# Patient Record
Sex: MALE | Race: OTHER | HISPANIC OR LATINO | URBAN - METROPOLITAN AREA
[De-identification: names, ages, dates, MRNs, and addresses within clinical notes are randomized per-mention and may not be internally consistent; named-entity substitution may affect disease eponyms.]

---

## 2024-09-02 ENCOUNTER — HOSPITAL ENCOUNTER (EMERGENCY)
Facility: MEDICAL CENTER | Age: 44
End: 2024-09-02
Attending: EMERGENCY MEDICINE

## 2024-09-02 VITALS
HEIGHT: 64 IN | WEIGHT: 223.99 LBS | SYSTOLIC BLOOD PRESSURE: 140 MMHG | BODY MASS INDEX: 38.24 KG/M2 | TEMPERATURE: 97.7 F | HEART RATE: 78 BPM | DIASTOLIC BLOOD PRESSURE: 78 MMHG | OXYGEN SATURATION: 94 % | RESPIRATION RATE: 18 BRPM

## 2024-09-02 DIAGNOSIS — K43.9 VENTRAL HERNIA WITHOUT OBSTRUCTION OR GANGRENE: ICD-10-CM

## 2024-09-02 DIAGNOSIS — R10.13 EPIGASTRIC PAIN: ICD-10-CM

## 2024-09-02 LAB
ALBUMIN SERPL BCP-MCNC: 4.3 G/DL (ref 3.2–4.9)
ALBUMIN/GLOB SERPL: 1.4 G/DL
ALP SERPL-CCNC: 72 U/L (ref 30–99)
ALT SERPL-CCNC: 48 U/L (ref 2–50)
ANION GAP SERPL CALC-SCNC: 12 MMOL/L (ref 7–16)
AST SERPL-CCNC: 29 U/L (ref 12–45)
BASOPHILS # BLD AUTO: 0.9 % (ref 0–1.8)
BASOPHILS # BLD: 0.06 K/UL (ref 0–0.12)
BILIRUB SERPL-MCNC: 0.4 MG/DL (ref 0.1–1.5)
BUN SERPL-MCNC: 15 MG/DL (ref 8–22)
CALCIUM ALBUM COR SERPL-MCNC: 8.9 MG/DL (ref 8.5–10.5)
CALCIUM SERPL-MCNC: 9.1 MG/DL (ref 8.5–10.5)
CHLORIDE SERPL-SCNC: 105 MMOL/L (ref 96–112)
CO2 SERPL-SCNC: 23 MMOL/L (ref 20–33)
CREAT SERPL-MCNC: 0.68 MG/DL (ref 0.5–1.4)
EKG IMPRESSION: NORMAL
EOSINOPHIL # BLD AUTO: 0.08 K/UL (ref 0–0.51)
EOSINOPHIL NFR BLD: 1.2 % (ref 0–6.9)
ERYTHROCYTE [DISTWIDTH] IN BLOOD BY AUTOMATED COUNT: 43.3 FL (ref 35.9–50)
GFR SERPLBLD CREATININE-BSD FMLA CKD-EPI: 119 ML/MIN/1.73 M 2
GLOBULIN SER CALC-MCNC: 3 G/DL (ref 1.9–3.5)
GLUCOSE SERPL-MCNC: 122 MG/DL (ref 65–99)
HCT VFR BLD AUTO: 50.6 % (ref 42–52)
HGB BLD-MCNC: 16.7 G/DL (ref 14–18)
IMM GRANULOCYTES # BLD AUTO: 0.1 K/UL (ref 0–0.11)
IMM GRANULOCYTES NFR BLD AUTO: 1.5 % (ref 0–0.9)
LIPASE SERPL-CCNC: 25 U/L (ref 11–82)
LYMPHOCYTES # BLD AUTO: 2.5 K/UL (ref 1–4.8)
LYMPHOCYTES NFR BLD: 36.7 % (ref 22–41)
MCH RBC QN AUTO: 27.9 PG (ref 27–33)
MCHC RBC AUTO-ENTMCNC: 33 G/DL (ref 32.3–36.5)
MCV RBC AUTO: 84.6 FL (ref 81.4–97.8)
MONOCYTES # BLD AUTO: 0.7 K/UL (ref 0–0.85)
MONOCYTES NFR BLD AUTO: 10.3 % (ref 0–13.4)
NEUTROPHILS # BLD AUTO: 3.38 K/UL (ref 1.82–7.42)
NEUTROPHILS NFR BLD: 49.4 % (ref 44–72)
NRBC # BLD AUTO: 0 K/UL
NRBC BLD-RTO: 0 /100 WBC (ref 0–0.2)
PLATELET # BLD AUTO: 242 K/UL (ref 164–446)
PMV BLD AUTO: 10.2 FL (ref 9–12.9)
POTASSIUM SERPL-SCNC: 4.1 MMOL/L (ref 3.6–5.5)
PROT SERPL-MCNC: 7.3 G/DL (ref 6–8.2)
RBC # BLD AUTO: 5.98 M/UL (ref 4.7–6.1)
SODIUM SERPL-SCNC: 140 MMOL/L (ref 135–145)
WBC # BLD AUTO: 6.8 K/UL (ref 4.8–10.8)

## 2024-09-02 PROCEDURE — 83690 ASSAY OF LIPASE: CPT

## 2024-09-02 PROCEDURE — 36415 COLL VENOUS BLD VENIPUNCTURE: CPT

## 2024-09-02 PROCEDURE — 99284 EMERGENCY DEPT VISIT MOD MDM: CPT

## 2024-09-02 PROCEDURE — 93005 ELECTROCARDIOGRAM TRACING: CPT

## 2024-09-02 PROCEDURE — 85025 COMPLETE CBC W/AUTO DIFF WBC: CPT

## 2024-09-02 PROCEDURE — 80053 COMPREHEN METABOLIC PANEL: CPT

## 2024-09-02 PROCEDURE — 93005 ELECTROCARDIOGRAM TRACING: CPT | Performed by: EMERGENCY MEDICINE

## 2024-09-02 RX ORDER — DOCUSATE SODIUM 100 MG/1
100 CAPSULE, LIQUID FILLED ORAL DAILY
Qty: 60 CAPSULE | Refills: 0 | Status: SHIPPED | OUTPATIENT
Start: 2024-09-02 | End: 2024-11-01

## 2024-09-02 NOTE — ED NOTES
Pt ambulated to the room with steady gait and without assistance. Changed into a gown and placed on Cardiac, SpO2 and BP monitors. Call light within reach. I-pad  left at bedside. No further complaints at this time. Chart up for ERP.

## 2024-09-02 NOTE — ED NOTES
Conversation with patient using language line . Agree with triage note. Charted for ERP. Call light within reach. Family at bedside.     MD at bedside.

## 2024-09-02 NOTE — DISCHARGE INSTRUCTIONS
Los análisis de rodolfo fueron normales. No hay signos de infección ni problemas con los órganos internos. El dolor puede provenir de la hernia. Pueden ser incómodas, mary no peligrosas, siempre que se puedan masajear suavemente para que vuelvan a koo lugar. Recomendamos utilizar el ablandador de heces recetado, ya que el estreñimiento y la necesidad de hacer fuerza para evacuar los intestinos pueden hacer que las hernias se agranden. Hemos enviado a un especialista para que le ayude a conseguir kimmy yany con un cirujano general para que considere la posibilidad de reparar la hernia.    (Your blood tests were all normal.  There is no sign of infection or any problems with internal organs.  Your pain may be coming from your hernia.  They can be uncomfortable, but not dangerous, as long as they can be gently massaged back inside.  We recommend using the prescribed stool softener, since constipation and pushing hard to pass bowel movements can make hernias larger.  We placed a referral to help you get an appointment with general surgery to consider repairing the hernia.)

## 2024-09-02 NOTE — ED TRIAGE NOTES
"Chief Complaint   Patient presents with    Abdominal Pain     Pain in the epigastric area that began a week ago with dizziness upon standing. Denies SOB, N/V. Denies PMH       Patient to triage ambulatory with a steady gait, AAOx4, Appropriate precautions in place.     Explained wait time and triage process. Placed back in lobby. Told to notify ED tech or RN of any changes, verbalized understanding.    /78   Pulse 85   Temp 36.5 °C (97.7 °F) (Temporal)   Resp 18   Ht 1.62 m (5' 3.78\")   Wt 102 kg (223 lb 15.8 oz)   SpO2 93%   BMI 38.71 kg/m²     "

## 2024-09-02 NOTE — ED NOTES
Patient provided discharge instructions using language line . Patient verbalized understanding. Patient leaving ER in stable condition. Patient ambulatory with steady gait. Wristband removed.

## 2024-09-02 NOTE — ED PROVIDER NOTES
"ER Provider Note    Scribed for Jonathan Thompson M.d. by Joy Carballo. 9/2/2024  11:58 AM    Primary Care Provider: No primary care provider noted.    CHIEF COMPLAINT  Chief Complaint   Patient presents with    Abdominal Pain     Pain in the epigastric area that began a week ago with dizziness upon standing. Denies SOB, N/V. Denies PMH     EXTERNAL RECORDS REVIEWED  No prior records for review    HPI/ROS  LIMITATION TO HISTORY   Select: : None    OUTSIDE HISTORIAN(S):  None    Chsae Gibbons is a 44 y.o. male who presents to the ED complaining of abdominal pain onset one month ago and worsening one week ago. The patient states his pain is not exacerbated by eating or drinking. He endorses additional midline abdominal swelling which has progressively worsened. He denies fever, chills, or vomiting. The patient does not drink alcohol. He denies any history of pancreatitis or gallstones.    PAST MEDICAL HISTORY  History reviewed. No pertinent past medical history.    SURGICAL HISTORY  History reviewed. No pertinent surgical history.    FAMILY HISTORY  History reviewed. No pertinent family history.    SOCIAL HISTORY   reports that he has quit smoking. His smoking use included cigarettes. He has never used smokeless tobacco. He reports that he does not drink alcohol and does not use drugs.    CURRENT MEDICATIONS  Previous Medications    No medications noted     ALLERGIES  None noted.    PHYSICAL EXAM  VITAL SIGNS: /78   Pulse 85   Temp 36.5 °C (97.7 °F) (Temporal)   Resp 18   Ht 1.62 m (5' 3.78\")   Wt 102 kg (223 lb 15.8 oz)   SpO2 93%   BMI 38.71 kg/m²   Pulse ox interpretation: I interpret this pulse ox as normal.  Constitutional: Alert in no apparent distress.  HENT: No signs of trauma, Bilateral external ears normal, Nose normal.   Eyes: Conjunctiva normal, Non-icteric.   Neck: Normal range of motion, Supple, No stridor.   Lymphatic: No lymphadenopathy noted.   Cardiovascular: Regular rate " and rhythm, no murmurs.   Thorax & Lungs: Normal breath sounds, No respiratory distress, No wheezing  Abdomen: Bowel sounds normal, Soft. Valsalva reproduces a vertical midline hernia whch spontaneously reduces with relaxation, no sign of strangulation or incarceration.  Skin: Warm, Dry, No erythema, No rash.   Back: No midline bony tenderness.   Extremities: Intact distal pulses, No edema, No cyanosis.  Musculoskeletal: Good range of motion in all major joints. No or major deformities noted.   Neurologic: Alert , Normal motor function, Normal sensory function, No focal deficits noted.   Psychiatric: Affect normal, Judgment normal, Mood normal.     DIAGNOSTIC STUDIES    Labs:   Labs Reviewed   CBC WITH DIFFERENTIAL - Abnormal; Notable for the following components:       Result Value    Immature Granulocytes 1.50 (*)     All other components within normal limits   COMP METABOLIC PANEL - Abnormal; Notable for the following components:    Glucose 122 (*)     All other components within normal limits   LIPASE   ESTIMATED GFR     All labs reviewed by me.    EKG:   I have independently interpreted this EKG  Results for orders placed or performed during the hospital encounter of 24   EKG   Result Value Ref Range    Report       [facility]    Test Date:  2024  Pt Name:    OLVIN HARKINS     Department: ER  MRN:        [id number]                      Room:  Gender:     Male                         Technician: [id number]  :        1980                   Requested By:ER TRIAGE PROTOCOL  Order #:    [id number]                    Reading MD:    Measurements  Intervals                                Axis  Rate:       77                           P:          48  MI:         143                          QRS:        85  QRSD:       98                           T:          61  QT:         376  QTc:        426    Interpretive Statements  Sinus rhythm  Borderline ST elevation,  anterior leads  No previous ECG available for comparison         COURSE & MEDICAL DECISION MAKING     INITIAL ASSESSMENT, COURSE AND PLAN  Care Narrative:     ED OBS: No; Patient does not meet criteria for ED Observation.     11:58 AM - Patient presents to the ED with abdominal pain. Per triage protocol, EKG, Lipase, CMP, and CBC w/ diff were ordered. Patient evaluated at bedside. Differential diagnoses include but not limited to: Abdominal wall pain from hernia versus intraabdominal infection, gastritis, or pancreatitis.    12:33 PM - Labs are reassuring, no evidence of a dangerous or infectious cause of the patient's symptoms, which do seem likely to be related to his hernia, though it is easily reducible with relaxation, not requiring manual reduction, no evidence of incarceration or strangulation.  He will be discharged with Colace, to soften stool, prevent straining, and will be referred to general surgery to consider repair.  The patient is advised to return for further evaluation should the patient's symptoms worsen. The patient understands and agrees to discharge home.       DISPOSITION AND DISCUSSIONS  I have discussed management of the patient with the following physicians and MONROE's:  None    Discussion of management with other QHP or appropriate source(s): None     Escalation of care considered, and ultimately not performed: diagnostic imaging.  Considered, but no bedside suggestion of obstruction.    Barriers to care at this time, including but not limited to: Patient does not have established PCP and Patient does not have insurance.     Decision tools and prescription drugs considered including, but not limited to: Medication modification was performed, to provide stool softeners to reduce straining, minimize discomfort, reduce abdominal wall pressure .    The patient will return for new or worsening symptoms and is stable at the time of discharge.    The patient is referred to a primary physician for  blood pressure management, diabetic screening, and for all other preventative health concerns.    DISPOSITION:  Patient will be discharged home in stable condition.    FOLLOW UP:  Magdalena Jason M.D.  [address] [place] [id number] [phone]    Schedule an appointment as soon as possible for a visit         OUTPATIENT MEDICATIONS:  There are no discharge medications for this patient.       FINAL DIAGNOSIS  1. Epigastric pain    2. Ventral hernia without obstruction or gangrene        Joy GOETZ (Bonnie), am scribing for, and in the presence of, Jonathan Thompson M.D..    Electronically signed by: Joy Carballo (Bonnie), 9/2/2024    Jonathan GOETZ M.D. personally performed the services described in this documentation, as scribed by Joy Carballo in my presence, and it is both accurate and complete.

## 2024-11-08 ENCOUNTER — APPOINTMENT (OUTPATIENT)
Dept: RADIOLOGY | Facility: MEDICAL CENTER | Age: 44
End: 2024-11-08
Attending: STUDENT IN AN ORGANIZED HEALTH CARE EDUCATION/TRAINING PROGRAM
Payer: OTHER MISCELLANEOUS

## 2024-11-08 ENCOUNTER — HOSPITAL ENCOUNTER (EMERGENCY)
Facility: MEDICAL CENTER | Age: 44
End: 2024-11-09
Attending: STUDENT IN AN ORGANIZED HEALTH CARE EDUCATION/TRAINING PROGRAM
Payer: OTHER MISCELLANEOUS

## 2024-11-08 DIAGNOSIS — S61.213A LACERATION OF LEFT MIDDLE FINGER WITHOUT FOREIGN BODY WITHOUT DAMAGE TO NAIL, INITIAL ENCOUNTER: ICD-10-CM

## 2024-11-08 DIAGNOSIS — S68.111A AMPUTATION OF LEFT INDEX FINGER: ICD-10-CM

## 2024-11-08 DIAGNOSIS — S68.113A AMPUTATION OF LEFT MIDDLE FINGER: ICD-10-CM

## 2024-11-08 LAB — HOLDING TUBE BB 8507: NORMAL

## 2024-11-08 PROCEDURE — 304999 HCHG REPAIR-SIMPLE/INTERMED LEVEL 1

## 2024-11-08 PROCEDURE — A9270 NON-COVERED ITEM OR SERVICE: HCPCS | Performed by: STUDENT IN AN ORGANIZED HEALTH CARE EDUCATION/TRAINING PROGRAM

## 2024-11-08 PROCEDURE — 99284 EMERGENCY DEPT VISIT MOD MDM: CPT

## 2024-11-08 PROCEDURE — 303747 HCHG EXTRA SUTURE

## 2024-11-08 PROCEDURE — 306637 HCHG MISC ORTHO ITEM RC 0274

## 2024-11-08 PROCEDURE — 26989 UNLISTED PX HANDS/FINGERS: CPT

## 2024-11-08 PROCEDURE — 26951 AMPUTATION OF FINGER/THUMB: CPT

## 2024-11-08 PROCEDURE — 700111 HCHG RX REV CODE 636 W/ 250 OVERRIDE (IP): Mod: JZ | Performed by: STUDENT IN AN ORGANIZED HEALTH CARE EDUCATION/TRAINING PROGRAM

## 2024-11-08 PROCEDURE — 700102 HCHG RX REV CODE 250 W/ 637 OVERRIDE(OP): Performed by: STUDENT IN AN ORGANIZED HEALTH CARE EDUCATION/TRAINING PROGRAM

## 2024-11-08 PROCEDURE — 73130 X-RAY EXAM OF HAND: CPT | Mod: LT

## 2024-11-08 PROCEDURE — 304217 HCHG IRRIGATION SYSTEM

## 2024-11-08 PROCEDURE — 96374 THER/PROPH/DIAG INJ IV PUSH: CPT

## 2024-11-08 PROCEDURE — 97597 DBRDMT OPN WND 1ST 20 CM/<: CPT

## 2024-11-08 RX ORDER — BUPIVACAINE HYDROCHLORIDE 5 MG/ML
10 INJECTION, SOLUTION EPIDURAL; INTRACAUDAL ONCE
Status: COMPLETED | OUTPATIENT
Start: 2024-11-08 | End: 2024-11-09

## 2024-11-08 RX ORDER — CEPHALEXIN 500 MG/1
500 CAPSULE ORAL ONCE
Status: COMPLETED | OUTPATIENT
Start: 2024-11-08 | End: 2024-11-08

## 2024-11-08 RX ADMIN — CEPHALEXIN 500 MG: 500 CAPSULE ORAL at 23:59

## 2024-11-08 RX ADMIN — MORPHINE SULFATE 6 MG: 10 INJECTION INTRAVENOUS at 21:27

## 2024-11-08 ASSESSMENT — FIBROSIS 4 INDEX: FIB4 SCORE: 0.73

## 2024-11-08 NOTE — LETTER
"    EMPLOYEE’S CLAIM FOR COMPENSATION/ REPORT OF INITIAL TREATMENT  FORM C-4  PLEASE TYPE OR PRINT    EMPLOYEE’S CLAIM - PROVIDE ALL INFORMATION REQUESTED   First Name                    GRISEL Stone                  Last Name  Nahid Gibbons Birthdate                    1980                Sex  Male Claim Number (Insurer’s Use Only)     Home Address  [address]  Age  44 y.o. Height  1.63 m (5' 4.17\") Weight  90.7 kg (200 lb) Social Security Number     [facility] [place]  [address] Telephone  [phone]   Mailing Address  [address] [facility] Zip  [address] Primary Language Spoken  Telugu    INSURER   [employer]PART[employer]       Employee's Occupation (Job Title) When Injury or Occupational Disease Occurred  [employer]   Employer's Name/Company Name    [employer] Telephone   [phone]   Office Mail Address (Number and Street)    [address] [place] [address] [address], [place] [address]   Date of Injury (if applicable) 11/8/2024               Hours Injury (if applicable)  7:25 PM Date Employer Notified  11/8/2024 Last Day of Work after Injury or Occupational Disease  11/8/2024 Supervisor to Whom Injury Reported  José Miguel Neumann   Address or Location of Accident (if applicable)  [address] [place] [address]   What were you doing at the time of accident? (if applicable)  Limpiando la [employer]    How did this injury or occupational disease occur? (Be specific and answer in detail. Use additional sheet if necessary)  Estaba Limpiando la maquina y tenia dorina sensor y se prendio   If you believe that you have an occupational disease, when did you first have knowledge of the disability and its relationship to your employment?   Witnesses to the Accident (if applicable)  todos Mayur Reynoso      Nature of Injury or Occupational Disease  Laceration  Part(s) of Body " Injured or Affected  Thumb (L)      I CERTIFY THAT THE ABOVE IS TRUE AND CORRECT TO T HE BEST OF MY KNOWLEDGE AND THAT I HAVE PROVIDED THIS INFORMATION IN ORDER TO OBTAIN THE BENEFITS OF [place]CC[place], OR [place]  I HEREBY AUTHORIZE ANY PHYSICIAN, CHIROPRACTOR, SURGEON, PRACTITIONER OR ANY OTHER PERSON, ANY HOSPITAL, INCLUDING [facility] OR [facility], ANY  MEDICAL SERVICE ORGANIZATION, ANY INSURANCE COMPANY, OR OTHER INSTITUTION OR ORGANIZATION TO RELEASE TO EACH OTHER, ANY MEDICAL OR OTHER INFORMATION, INCLUDING BENEFITS PAID OR PAYABLE, PERTINENT TO THIS INJURY OR DISEASE, EXCEPT INFORMATION RELATIVE TO DIAGNOSIS, TREATMENT AND/OR COUNSELING FOR AIDS, PSYCHOLOGICAL CONDITIONS, ALCOHOL OR CONTROLLED SUBSTANCES, FOR WHICH I MUST GIVE SPECIFIC AUTHORIZATION.  A PHOTOSTAT OF THIS AUTHORIZATION SHALL BE VALID AS THE ORIGINAL.     Date 11/08/2024   Place  [facility] Employee’s Original or  *Electronic Signature   THIS REPORT MUST BE COMPLETED AND MAILED WITHIN 3 WORKING DAYS OF TREATMENT   Place  [facility], EMERGENCY DEPT    Name of Facility      Date 11/8/2024 Diagnosis and Description of Injury or Occupational Disease  (S61.213A) Laceration of left middle finger without foreign body without damage to nail, initial encounter  (S68.111A) Amputation of left index finger  (S68.113A) Amputation of left middle finger  Diagnoses of Laceration of left middle finger without foreign body without damage to nail, initial encounter, Amputation of left index finger, and Amputation of left middle finger were pertinent to this visit. Is there evidence that the injured employee was under the influence of alcohol and/or another controlled substance at the time of accident?  []No  [] Yes (if yes, please explain)   Hour   No   Treatment: Antibiotic prophylaxis, excisional debridement, amputation revision,  laceration repair    Have you advised the patient to remain off work five days or more?   [] Yes Indicate dates: From   To    [] No      If no, is the injured employee capable of: [] full duty [] modified duty                     If modified duty, specify any limitations / restrictions:  Unable to use left hand until cleared by orthopedic surgeon                                                                                                                                                                                                                                                                                                                                                                                                               X-Ray Findings: Positive    From information given by the employee, together with medical evidence, can you directly connect this injury or occupational disease as job incurred?  []Yes   [] No Yes    Is additional medical care by a physician indicated? []Yes [] No  Yes  Comments:Will require orthopedic follow-up and clearance    Do you know of any previous injury or disease contributing to this condition or occupational disease? []Yes [] No (Explain if yes)                          No   Date  11/8/2024 Print Health Care Provider’s Name   I certify that the employer’s copy of  this form was delivered to the employer on:   Address  [address] [place] [address]    INSURER'S USE ONLY                       [facility]   [address] Provider’s Tax ID Number  [id number]   Telephone  Dept: [phone]    Health Care Provider’s Original or Electronic Signature  e-SHAHRZAD Robbins M.D. Degree (MD,DO, DC,PA-C,APRN)  MD  Choose (if applicable)      ORIGINAL - TREATING HEALTHCARE PROVIDER PAGE 2 - INSURER/TPA PAGE 3 - EMPLOYER PAGE 4 - EMPLOYEE             Form C-4 (rev.08/23)

## 2024-11-09 ENCOUNTER — PHARMACY VISIT (OUTPATIENT)
Dept: PHARMACY | Facility: MEDICAL CENTER | Age: 44
End: 2024-11-09
Payer: COMMERCIAL

## 2024-11-09 VITALS
TEMPERATURE: 98 F | BODY MASS INDEX: 34.15 KG/M2 | WEIGHT: 200 LBS | SYSTOLIC BLOOD PRESSURE: 118 MMHG | HEART RATE: 72 BPM | RESPIRATION RATE: 18 BRPM | OXYGEN SATURATION: 95 % | DIASTOLIC BLOOD PRESSURE: 66 MMHG | HEIGHT: 64 IN

## 2024-11-09 PROCEDURE — RXOTC WILLOW AMBULATORY OTC CHARGE: Performed by: PHARMACIST

## 2024-11-09 PROCEDURE — 700111 HCHG RX REV CODE 636 W/ 250 OVERRIDE (IP): Performed by: STUDENT IN AN ORGANIZED HEALTH CARE EDUCATION/TRAINING PROGRAM

## 2024-11-09 PROCEDURE — RXMED WILLOW AMBULATORY MEDICATION CHARGE: Performed by: STUDENT IN AN ORGANIZED HEALTH CARE EDUCATION/TRAINING PROGRAM

## 2024-11-09 RX ORDER — BUPIVACAINE HYDROCHLORIDE 5 MG/ML
10 INJECTION, SOLUTION EPIDURAL; INTRACAUDAL ONCE
Status: COMPLETED | OUTPATIENT
Start: 2024-11-09 | End: 2024-11-09

## 2024-11-09 RX ORDER — HYDROCODONE BITARTRATE AND ACETAMINOPHEN 5; 325 MG/1; MG/1
1 TABLET ORAL EVERY 4 HOURS PRN
Qty: 15 TABLET | Refills: 0 | Status: SHIPPED | OUTPATIENT
Start: 2024-11-09 | End: 2024-11-12

## 2024-11-09 RX ORDER — CEPHALEXIN 500 MG/1
500 CAPSULE ORAL 2 TIMES DAILY
Qty: 14 CAPSULE | Refills: 0 | Status: ACTIVE | OUTPATIENT
Start: 2024-11-09 | End: 2024-11-12 | Stop reason: SDUPTHER

## 2024-11-09 RX ADMIN — BUPIVACAINE HYDROCHLORIDE 10 ML: 5 INJECTION, SOLUTION EPIDURAL; INTRACAUDAL at 00:03

## 2024-11-09 RX ADMIN — BUPIVACAINE HYDROCHLORIDE 10 ML: 5 INJECTION, SOLUTION EPIDURAL; INTRACAUDAL at 00:02

## 2024-11-09 NOTE — ED NOTES
"Chase Gibbons has been discharged from the Emergency Room.    IV discontinued and gauze bandage placed, pt in possession of belongings.    Discharge instructions, which include signs and symptoms to monitor patient for, as well as detailed information regarding Laceration care provided.  Patient verbalizes understanding of follow up care and medication management. All questions and concerns addressed at this time.     Patient provided with education on when to return to the ER and verbally understands with no concerns. Patient advised on setting up MyChart and information provided about patient survey.  Patient leaves ER in no apparent distress. This RN provided education regarding returning to the ER for any new concerns or changes in patient's condition.      /66   Pulse 72   Temp 36.7 °C (98 °F)   Resp 18   Ht 1.63 m (5' 4.17\")   Wt 90.7 kg (200 lb)   SpO2 95%   BMI 34.15 kg/m²   "

## 2024-11-09 NOTE — ED TRIAGE NOTES
Chase Gibbons  44 y.o. male  Chief Complaint   Patient presents with    Amputation     BIB [facility] from work. Left middle and ring finger amputation from . No active bleeding. No thinners. Received fentanyl 100 mcg. States Last tetanus 5 yrs ago. Amputated middle finger kept on ice.      Pt is GCS 15, speaking in full sentences, follows commands and responds appropriately to questions. Resp are even and unlabored. Patient denies pain.     Vitals:    11/08/24 2036   BP:    Pulse: 84   Resp:    Temp:    SpO2: 95%

## 2024-11-09 NOTE — DISCHARGE INSTRUCTIONS
Ibuprofen 400 mg every 4-6 hours    Tylenol 1000 mg every 4 hours    Do not take more than 4000 mg of Tylenol in a 24-hour period    Take norco if ibuprofen and tylenol does not work

## 2024-11-09 NOTE — ED PROVIDER NOTES
"ER Provider Note    Scribed for Dr. Yossi Madrigal MD. by Irwin Lockwood. 11/8/2024  8:58 PM    Primary Care Provider: No primary care provider noted.    CHIEF COMPLAINT  Chief Complaint   Patient presents with    Amputation     BIB [facility] from work. Left middle and ring finger amputation from . No active bleeding. No thinners. Received fentanyl 100 mcg. States Last tetanus 5 yrs ago. Amputated middle finger kept on ice.        EXTERNAL RECORDS REVIEWED  Patient was last seen here on 9/2/24 for epigastric pain and was subsequently discharged.    HPI/ROS  LIMITATION TO HISTORY   Select: Language Sinhala,  Used     OUTSIDE HISTORIAN(S):  Significant other is present at bedside.    Chase Gibbons is a 44 y.o. right handed male who presents to the ED for evaluation of left middle and ring finger partial amputation from a  at work onset 1 hour ago. Denies blood thinners. Last tetanus was 5 years ago. Denies any medical problems.    PAST MEDICAL HISTORY  No past medical history noted.    SURGICAL HISTORY  No past surgical history noted.    FAMILY HISTORY  No family history noted.    SOCIAL HISTORY   reports that he has quit smoking. His smoking use included cigarettes. He has never used smokeless tobacco. He reports that he does not drink alcohol and does not use drugs.    CURRENT MEDICATIONS  Previous Medications    No medications noted.       ALLERGIES  Patient has no known allergies.    PHYSICAL EXAM  BP (!) 143/86   Pulse 84   Temp 36.7 °C (98 °F) (Temporal)   Resp 18   Ht 1.63 m (5' 4.17\")   Wt 90.7 kg (200 lb)   SpO2 95%   BMI 34.15 kg/m²   Physical Exam  Vitals and nursing note reviewed.   Constitutional:       Appearance: He is well-developed.   HENT:      Head: Normocephalic.   Cardiovascular:      Rate and Rhythm: Normal rate and regular rhythm.      Heart sounds: No murmur heard.  Pulmonary:      Effort: Pulmonary effort is normal.      Breath sounds: Normal " "breath sounds. No wheezing or rales.   Abdominal:      Palpations: Abdomen is soft.      Tenderness: There is no abdominal tenderness.   Musculoskeletal:      Comments: Complete amputation of the second left finger at the level of the midshaft proximal phalanx  Complete amputation of distal phalanx of the third left finger.   Skin:     General: Skin is warm.      Comments: 3 cm laceration to the volar aspect of the left middle finger   Neurological:      General: No focal deficit present.      Mental Status: He is alert and oriented to person, place, and time.         DIAGNOSTIC STUDIES & PROCEDURES    Radiology:   The attending Emergency Physician has independently interpreted the diagnostic imaging associated with this visit and is awaiting the final reading from the radiologist, which will be displayed below.    Preliminary interpretation is a follows: X-ray of left hand with amputation of index finger and distal phalanx of middle finger  Radiologist interpretation:    DX-HAND 3+ LEFT   Final Result         1.  Long finger middle phalanx amputation.   2.  Index finger proximal phalanx amputation.           Orthopedic Nerve Block (Finger)        Authorized by: Yossi Madrigal      Consent: Verbal consent obtained.      Risks and benefits: risks, benefits and alternatives were discussed      Consent given by: patient and/or guardian      Patient understanding: patient/guardian states understanding of the procedure being performed      Patient consent: the patient/guardian's understanding of the procedure matches consent given      Patient identity confirmed: verbally with patient and arm band      Time out: Immediately prior to procedure a \"time out\" was called to verify the correct patient, procedure, equipment, support staff and site/side marked as required.      Location details: left index finger      Injection: using standard, sterile technique 4 cc of bupivacaine without epinepherine injected      Complication: " "Tolerated well without complication.     Orthopedic Nerve Block (Finger)        Authorized by: Yossi Madrigal      Consent: Verbal consent obtained.      Risks and benefits: risks, benefits and alternatives were discussed      Consent given by: patient and/or guardian      Patient understanding: patient/guardian states understanding of the procedure being performed      Patient consent: the patient/guardian's understanding of the procedure matches consent given      Patient identity confirmed: verbally with patient and arm band      Time out: Immediately prior to procedure a \"time out\" was called to verify the correct patient, procedure, equipment, support staff and site/side marked as required.      Location details: left middle finger      Injection: using standard, sterile technique 4 cc of bupivacaine without epinepherine injected      Complication: Tolerated well without complication.     Amputation revision procedure #1    Verbal consent obtained from patient after explaining risks and benefits.    Patient placed in the appropriate position skin was prepped with chlorhexidine swab and draped in sterile fashion and anesthesia of the left index finger was achieved with a digital block using 0.5% bupivacaine without epinephrine.  The amputated finger was then extensively scrubbed with a surgical scrub pad and irrigated with jet normal saline.  Excisional debridement was performed on the wound with removal of subcutaneous fat, tendon and bone. Approximately 1 mm of bone was removed. Bone cutters and rongeur's were then used to trim down the excess bone.  A 15 blade scalpel was then used to make a V shaped incision on the plantar aspect of the finger and a VY plasty was performed to then cover the bone with overlying flap.  16 total sutures with 5-0 Ethilon were then used to secure the flap over the bone.  Procedure was tolerated well by the patient.      Amputation revision procedure #2  Verbal consent obtained from " patient after explaining risks and benefits.    Patient placed in the appropriate position skin was prepped with chlorhexidine swab and draped in sterile fashion and anesthesia of the left index finger was achieved with a digital block using 0.5% bupivacaine without epinephrine.  The amputated finger was then extensively scrubbed with a surgical scrub pad and irrigated with jet normal saline.  Excisional debridement was performed on the wound with removal of subcutaneous fat, tendon and bone. Approximately 1 mm of bone was removed.  5-0 Ethilon sutures were then used to close the skin edges over the exposed bone portion.  13 total sutures were used.  The patient tolerated the procedure well with no complications    Laceration Repair Procedure    Indication: Laceration    Location/Description: volar aspect of left middle finger    Procedure: The patient was placed in the appropriate position and anesthesia around the laceration was obtained with a full digital block of the left long (middle) finger using 0.5% Bupivacaine without epinephrine. The area was then irrigated with normal saline. The laceration was closed with 5-0 Ethilon using interrupted sutures. There were no additional lacerations requiring repair. The wound area was then dressed with gauze.      Total repaired wound length: 3 cm.     Other Items: Suture count: 3    The patient tolerated the procedure well.    Complications: None        COURSE & MEDICAL DECISION MAKING    INITIAL ASSESSMENT AND PLAN  Care Narrative:   Narcotics Script: In prescribing controlled substances to this patient, I certify that I have obtained and reviewed the medical history of Chase Gibbons. I have also made a good alexandra effort to obtain applicable records from other providers who have treated the patient and records did not demonstrate any increased risk of substance abuse that would prevent me from prescribing controlled substances.     I have conducted a physical  exam and documented it. I have reviewed Mr. Nahid Gibbons’s prescription history as maintained by the [place] Prescription Monitoring Program.     I have assessed the patient’s risk for abuse, dependency, and addiction using the validated Opioid Risk Tool available at https://www.mdcalc.com/tbbjiw-quhf-eazf-ort-narcotic-abuse.     Given the above, I believe the benefits of controlled substance therapy outweigh the risks. The reasons for prescribing controlled substances include non-narcotic, oral analgesic alternatives have been inadequate for pain control. Accordingly, I have discussed the risk and benefits, treatment plan, and alternative therapies with the patient.       8:58 PM - Patient seen and evaluated at bedside.     0921 pm -discussed the case with Ortho on-call Dr. Austin who recommends reaching out to [facility] regarding replant however he believes will likely not meet criteria and they will recommend amputation revision    9:34 PM - I discussed the patient's case and the above findings with [facility] in [place] who has paged the on call hand surgeon.     9:36 PM I discussed the patient's case and the above findings with Dr. Crespo from [facility] who recommends amputation revision. He does not feel replant would be successful or beneficial to the patient.     9:52 PM - Patient was reevaluated at bedside. Discussed the plan of care, including amputation revision. Patient is agreeable to the plan.    12:17 AM - Patient was reevaluated at bedside.    12:24 AM - Patient was reevaluated at bedside. Discussed the plan for discharge including strict return precautions and wound care instructions. Patient and his family are agreeable to the plan.    ADDITIONAL PROBLEM LIST AND DISPOSITION  No past medical history on file.                 DISPOSITION AND DISCUSSIONS  I have discussed management of the patient with the following physicians and MONROE's: Dr. Crespo (hand surgery at [facility]),  Dr Austin ortho    Discussion of management with other [facility] or appropriate source(s): None     Escalation of care considered, and ultimately not performed: .    Barriers to care at this time, including but not limited to: Patient does not have established PCP.     Decision tools and prescription drugs considered including, but not limited to: Antibiotics Keflex and Pain Medications NSAIDs Tylenol Norco .    I reviewed prescription monitoring program for patient's narcotic use before prescribing a scheduled drug.The patient will not drink alcohol nor drive with prescribed medications. The patient will return for new or worsening symptoms and is stable at the time of discharge.    The patient is referred to a primary physician for blood pressure management, diabetic screening, and for all other preventative health concerns.    In prescribing controlled substances to this patient, I certify that I have obtained and reviewed the medical history of Chase Gibbons. I have also made a good alexandra effort to obtain applicable records from other providers who have treated the patient and records did not demonstrate any increased risk of substance abuse that would prevent me from prescribing controlled substances.     I have conducted a physical exam and documented it. I have reviewed Mr. Nahid Gibbons’s prescription history as maintained by the [place] Prescription Monitoring Program.     I have assessed the patient’s risk for abuse, dependency, and addiction using the validated Opioid Risk Tool available at https://www.mdcalc.com/qrriso-voms-ocfe-ort-narcotic-abuse.     Given the above, I believe the benefits of controlled substance therapy outweigh the risks. The reasons for prescribing controlled substances include non-narcotic, oral analgesic alternatives have been inadequate for pain control. Accordingly, I have discussed the risk and benefits, treatment plan, and alternative therapies with the patient.       DISPOSITION:  Patient will be discharged home in stable condition.    FOLLOW UP:  Hira Austin M.D.  [address] [place] [id number] [phone]    Schedule an appointment as soon as possible for a visit       [facility] [place] [facility], Emergency Dept  [address]  [place] [id number] [phone]          OUTPATIENT MEDICATIONS:  New Prescriptions    CEPHALEXIN (KEFLEX) 500 MG CAP    Take 1 Capsule by mouth 2 times a day for 7 days.    HYDROCODONE-ACETAMINOPHEN (NORCO) 5-325 MG TAB PER TABLET    Take 1 Tablet by mouth every four hours as needed (severe pain) for up to 3 days.        FINAL IMPRESSION   1. Laceration of left middle finger without foreign body without damage to nail, initial encounter    2. Amputation of left index finger    3. Amputation of left middle finger         Irwin GOETZ (Bonnie), am scribing for, and in the presence of, Yossi Madrigal M.D..    Electronically signed by: Irwin Valerio), 11/8/2024    IoYssi M.D. personally performed the services described in this documentation, as scribed by Irwin Lockwood in my presence, and it is both accurate and complete.    The note accurately reflects work and decisions made by me.  Yossi Madrigal M.D.  11/9/2024  12:53 AM

## 2024-11-12 ENCOUNTER — HOSPITAL ENCOUNTER (EMERGENCY)
Facility: MEDICAL CENTER | Age: 44
End: 2024-11-12
Attending: EMERGENCY MEDICINE
Payer: OTHER MISCELLANEOUS

## 2024-11-12 VITALS
HEART RATE: 74 BPM | TEMPERATURE: 98.1 F | WEIGHT: 216.71 LBS | OXYGEN SATURATION: 92 % | BODY MASS INDEX: 37 KG/M2 | HEIGHT: 64 IN | DIASTOLIC BLOOD PRESSURE: 79 MMHG | RESPIRATION RATE: 18 BRPM | SYSTOLIC BLOOD PRESSURE: 130 MMHG

## 2024-11-12 DIAGNOSIS — Z51.89 ENCOUNTER FOR WOUND RE-CHECK: ICD-10-CM

## 2024-11-12 DIAGNOSIS — S68.119A TRAUMATIC AMPUTATION OF FINGER, INITIAL ENCOUNTER: ICD-10-CM

## 2024-11-12 DIAGNOSIS — T79.2XXA SEROMA DUE TO TRAUMA (HCC): ICD-10-CM

## 2024-11-12 PROCEDURE — A9270 NON-COVERED ITEM OR SERVICE: HCPCS | Performed by: EMERGENCY MEDICINE

## 2024-11-12 PROCEDURE — 700102 HCHG RX REV CODE 250 W/ 637 OVERRIDE(OP): Performed by: EMERGENCY MEDICINE

## 2024-11-12 PROCEDURE — 99283 EMERGENCY DEPT VISIT LOW MDM: CPT

## 2024-11-12 RX ORDER — CEPHALEXIN 500 MG/1
500 CAPSULE ORAL ONCE
Status: COMPLETED | OUTPATIENT
Start: 2024-11-12 | End: 2024-11-12

## 2024-11-12 RX ORDER — CEPHALEXIN 500 MG/1
500 CAPSULE ORAL 4 TIMES DAILY
Qty: 28 CAPSULE | Refills: 0 | Status: ACTIVE | OUTPATIENT
Start: 2024-11-12 | End: 2024-11-19

## 2024-11-12 RX ADMIN — CEPHALEXIN 500 MG: 500 CAPSULE ORAL at 13:20

## 2024-11-12 ASSESSMENT — FIBROSIS 4 INDEX: FIB4 SCORE: 0.73

## 2024-11-12 ASSESSMENT — PAIN DESCRIPTION - PAIN TYPE: TYPE: ACUTE PAIN

## 2024-11-12 NOTE — LETTER
PHYSICIAN’S AND CHIROPRACTIC PHYSICIAN'S   PROGRESS REPORT   CERTIFICATION OF DISABILITY Claim Number:     Social Security Number: [id number]   Patient’s Name: Chase Gibbons Date of Injury: 11/8/2024   Employer:   Name of MCO (if applicable):      Patient’s Job Description/Occupation: @[id number](CLM,312,1,,,,,2) )@       Previous Injuries/Diseases/Surgeries Contributing to the Condition:         Diagnosis: (Z51.89) Encounter for wound re-check  (T79.2XXA) Seroma due to trauma (HCC)  (S68.119A) Traumatic amputation of finger, initial encounter      Related to the Industrial Injury?       Explain:        Objective Medical Findings:           None - Discharged                         Stable                    Ratable           Generally Improved                         Condition Worsened                  Condition Same  May Have Suffered a Permanent Disability       Treatment Plan:              No Change in Therapy                  PT/OT Prescribed                      Medication May be Used While Working        Case Management                          PT/OT Discontinued    Consultation    Further Diagnostic Studies:    Prescription(s)                 Released to FULL DUTY /No Restrictions on (Date):       Certified TOTALLY TEMPORARILY DISABLED (Indicate Dates) From:   To:      Released to RESTRICTED/Modified Duty on (Date): From:   To:    Restrictions Are:         No Sitting    No Standing    No Pulling Other:         No Bending at Waist     No Stooping     No Lifting        No Carrying     No Walking Lifting Restricted to (lbs.):          No Pushing        No Climbing     No Reaching Above Shoulders       Date of Next Visit:    Date of this Exam: 11/12/2024 Physician/Chiropractic Physician Name: No name on file Physician/Chiropractic Physician Signature:     [place]:  [address] [place] [address], [address] [place], [place] [address] - Telephone [phone] [place]:  [address]  [facility] [facility], [address] [facility]  [place], [place] [address] - Telephone [phone]    [email or web address]  D-39 (Rev. 10/24)

## 2024-11-12 NOTE — ED PROVIDER NOTES
"  ER Provider Note    Scribed for Kristofer Stallworth M.D. by Tai Hednerson. 11/12/2024   12:52 PM    Primary Care Provider: Pcp Pt States None    CHIEF COMPLAINT  Chief Complaint   Patient presents with    Wound Check     On 11/8 pt was seen for partial amputation of 2nd and 3rd digit on left hand from . Pt reports over the last few days there has been increased redness/swelling to the area, denies drainage or fevers.      EXTERNAL RECORDS REVIEWED  Outpatient Notes Patient was seen here four days ago after partially amputating his long and index finger.     HPI/ROS  LIMITATION TO HISTORY   Select: : None  OUTSIDE HISTORIAN(S):  None    Chase Gibbons is a 44 y.o. male who presents to the ED for evaluation of his healing wound suspecting infection, onset four days ago. Patient reports that he was seen four days ago after partially amputating his right long and index finger when using a . Over the past two days he has noticed increased redness and swelling to the area, but denies any drainage or fevers. Patient has not picked up or taken any antibiotics after he was seen for his amputation.    PAST MEDICAL HISTORY  None reported.     SURGICAL HISTORY  No past surgical history on file.    FAMILY HISTORY  None pertinent.     SOCIAL HISTORY   reports that he has quit smoking. His smoking use included cigarettes. He has never used smokeless tobacco. He reports that he does not drink alcohol and does not use drugs.    CURRENT MEDICATIONS  Previous Medications    HYDROCODONE-ACETAMINOPHEN (NORCO) 5-325 MG TAB PER TABLET    Take 1 Tablet by mouth every four hours as needed (severe pain) for up to 3 days.       ALLERGIES  No Known Allergies     PHYSICAL EXAM  /77   Pulse 74   Temp 36.7 °C (98.1 °F) (Oral)   Resp 14   Ht 1.626 m (5' 4\")   Wt 98.3 kg (216 lb 11.4 oz)   SpO2 95%   BMI 37.20 kg/m²    Nursing note and vitals reviewed.  Constitutional: Well-developed and " well-nourished. No distress.   HENT: Head is normocephalic and atraumatic. Oropharynx is clear and moist without exudate or erythema.   Eyes: Pupils are equal, round. Conjunctiva are normal.   Cardiovascular: Strong radial pulse. Capillary refill is less than 2 seconds distal to the point of injury.  Pulmonary/Chest: No respiratory distress. Chest wall is atraumatic.   Musculoskeletal: Wounds of the index and long fingers are healing well. Small fluid collection most likely seroma on the distal aspect of the long finger remnant. There is no significant erythema or purulent discharge. Bilateral lower extremities are atraumatic.  Neurological: Awake, alert and oriented to person, place, and time. No focal deficits noted. Strength and sensation are normal distal to the point of injury.  Skin: Skin is warm and dry. No rash. Healing wounds to the index and long fingers.   Psychiatric: Appropriate for clinical situation.    INITIAL ASSESSMENT AND PLAN    12:52 PM - Patient was evaluated at bedside. Patient arrives today with healing wounds after having his left pointer and long finger amputated four days ago. Patient does not have any obvious infection on physical examination, and I discussed administration of antibiotics as an outpatient. Patient verbalizes understanding and support with my plan of care. I discussed plan for discharge and follow up as outlined below. The patient is stable for discharge at this time and will return for any new or worsening symptoms. Patient verbalizes understanding and support with my plan for discharge.         I have discussed management of the patient with the following physicians and MONROE's:  None.     Discussion of management with other [facility] or appropriate source(s): None     Barriers to care at this time, including but not limited to: Patient does not have established PCP.     Decision tools and prescription drugs considered including, but not limited to: Antibiotics Keflex .     The  patient will return for new or worsening symptoms and is stable at the time of discharge.    DISPOSITION:  Patient will be discharged home in stable condition.    FOLLOW UP:  [facility], Emergency Dept  [address] [facility]  [place] [id number] [phone]    If symptoms worsen    Castillo Chaudhary M.D.  [address] [place] [id number] [phone]    Schedule an appointment as soon as possible for a visit   hand specialist      OUTPATIENT MEDICATIONS:  New Prescriptions    No medications on file        FINAL DIAGNOSIS  1. Encounter for wound re-check    2. Seroma due to trauma (HCC)    3. Traumatic amputation of finger, initial encounter         ITai (Scribe), am scribing for, and in the presence of, Kristofer Stallworth M.D..    Electronically signed by: Tai Henderson (Scribe), 11/12/2024    IKritsofer M.D. personally performed the services described in this documentation, as scribed by Tai Henderson in my presence, and it is both accurate and complete.      The note accurately reflects work and decisions made by me.  Kristofer Stallworth M.D.  11/12/2024  1:24 PM

## 2024-11-12 NOTE — ED TRIAGE NOTES
"Chief Complaint   Patient presents with    Wound Check     On 11/8 pt was seen for partial amputation of 2nd and 3rd digit on left hand from . Pt reports over the last few days there has been increased redness/swelling to the area, denies drainage or fevers.        43 yo M to triage for above complaint. Pt reports he did not  ABX prescribed last visit.      Pt placed in lobby. Pt educated on triage process. Pt encouraged to alert staff for any changes.     Patient and staff wearing appropriate PPE    /77   Pulse 74   Temp 36.7 °C (98.1 °F) (Oral)   Resp 14   Ht 1.626 m (5' 4\")   Wt 98.3 kg (216 lb 11.4 oz)   SpO2 95%   BMI 37.20 kg/m²     "

## 2024-11-12 NOTE — LETTER
PHYSICIAN’S AND CHIROPRACTIC PHYSICIAN'S   PROGRESS REPORT   CERTIFICATION OF DISABILITY Claim Number:     Social Security Number: [id number]   Patient’s Name: Chase Gibbons Date of Injury: 11/8/2024   Employer: [employer] Name of MCO (if applicable):      Patient’s Job Description/Occupation: [employer]       Previous Injuries/Diseases/Surgeries Contributing to the Condition:  none      Diagnosis: (Z51.89) Encounter for wound re-check  (T79.2XXA) Seroma due to trauma (HCC)  (S68.119A) Traumatic amputation of finger, initial encounter      Related to the Industrial Injury? Yes     Explain: Wound check      Objective Medical Findings: Healing partial finger amputations         None - Discharged                         Stable  No                 Ratable  No     X   Generally Improved                         Condition Worsened                  Condition Same  May Have Suffered a Permanent Disability No     Treatment Plan:    Dressing, wound care          No Change in Therapy                  PT/OT Prescribed                      Medication May be Used While Working        Case Management                          PT/OT Discontinued    Consultation    Further Diagnostic Studies:    Prescription(s)           keflex     Released to FULL DUTY /No Restrictions on (Date):       Certified TOTALLY TEMPORARILY DISABLED (Indicate Dates) From:   To:    X  Released to RESTRICTED/Modified Duty on (Date): From:   To:    Restrictions Are:         No Sitting    No Standing    No Pulling Other: No using right hand       No Bending at Waist     No Stooping     No Lifting        No Carrying     No Walking Lifting Restricted to (lbs.):          No Pushing        No Climbing     No Reaching Above Shoulders       Date of Next Visit:   Date of this Exam: 11/12/2024 Physician/Chiropractic Physician Name:  Physician/Chiropractic Physician Signature:     [place]:  [address]  [facility] [facility], [address] [place], [place] [address]  - Telephone [phone] [place]:  [address] [place] [address], [address] [place] [address] - Telephone [phone]    [email or web address]  D-39 (Rev. 10/24)

## 2024-11-12 NOTE — ED NOTES
Pt ambulated from the lobby to ortho 1 with a steady gait. Pt attached to SPO2 and BP monitoring. Pt charted up for ERP.

## 2024-11-18 ENCOUNTER — HOSPITAL ENCOUNTER (EMERGENCY)
Facility: MEDICAL CENTER | Age: 44
End: 2024-11-18

## 2024-11-18 VITALS
HEART RATE: 85 BPM | OXYGEN SATURATION: 95 % | TEMPERATURE: 96.9 F | RESPIRATION RATE: 16 BRPM | DIASTOLIC BLOOD PRESSURE: 77 MMHG | SYSTOLIC BLOOD PRESSURE: 127 MMHG

## 2024-11-18 PROCEDURE — 15853 REMOVAL SUTR/STAPL XREQ ANES: CPT

## 2024-11-18 PROCEDURE — 99281 EMR DPT VST MAYX REQ PHY/QHP: CPT | Mod: EDC

## 2024-11-18 NOTE — ED NOTES
Pt was seen for suture removal. 37 sutures removed from pt L middle and pointer fingers. Pt educated the importance to follow up with Dr. Chaudhary, orthopedic MD, and to complete full course of his antibiotic medication.

## 2024-12-04 PROBLEM — M79.642 LEFT HAND PAIN: Status: ACTIVE | Noted: 2024-12-04

## 2025-06-02 ENCOUNTER — OFFICE VISIT (OUTPATIENT)
Facility: MEDICAL CENTER | Age: 45
End: 2025-06-02
Payer: COMMERCIAL

## 2025-06-02 VITALS
DIASTOLIC BLOOD PRESSURE: 60 MMHG | BODY MASS INDEX: 38.96 KG/M2 | OXYGEN SATURATION: 98 % | HEIGHT: 64 IN | HEART RATE: 90 BPM | WEIGHT: 228.18 LBS | SYSTOLIC BLOOD PRESSURE: 108 MMHG | TEMPERATURE: 98.9 F

## 2025-06-02 DIAGNOSIS — K43.9 VENTRAL HERNIA WITHOUT OBSTRUCTION OR GANGRENE: Primary | ICD-10-CM

## 2025-06-02 DIAGNOSIS — M62.08 RECTUS DIASTASIS: ICD-10-CM

## 2025-06-02 PROCEDURE — 3074F SYST BP LT 130 MM HG: CPT | Performed by: SURGERY

## 2025-06-02 PROCEDURE — 99203 OFFICE O/P NEW LOW 30 MIN: CPT | Performed by: SURGERY

## 2025-06-02 PROCEDURE — 3078F DIAST BP <80 MM HG: CPT | Performed by: SURGERY

## 2025-06-02 RX ORDER — ATORVASTATIN CALCIUM 40 MG/1
TABLET, FILM COATED ORAL
COMMUNITY
Start: 2025-03-11

## 2025-06-02 ASSESSMENT — FIBROSIS 4 INDEX: FIB4 SCORE: 0.73

## 2025-06-02 NOTE — PROGRESS NOTES
Subjective:   6/2/2025  9:45 AM  Primary care physician:Pcp Pt States None      Chief Complaint:   Chief Complaint   Patient presents with    New Patient     ventral Hernia       History of presenting illness:  Chase Gibbons  is a pleasant 44 y.o. year old male who presented with a reducible bulge at the umbilicus as well as the upper abdomen.  He has noticed these issues for about 6 months now.  He feels the bulges present most of the time and these are especially prominent with lifting or straining.  He has not had any prior abdominal surgery and does not take any blood thinners or steroids at this time.      Imaging Reveals n/a    History reviewed. No pertinent past medical history.  History reviewed. No pertinent surgical history.  No Known Allergies  Current Outpatient Medications   Medication Sig    atorvastatin (LIPITOR) 40 MG Tab TAKE ONE TABLET BY MOUTH ONCE A DAY FOR HIGH cholesterol    ibuprofen (MOTRIN) 600 MG Tab TAKE ONE TABLET BY MOUTH THREE TIMES DAILY WITH FOOD OR MILK AS NEEDED     Social History     Socioeconomic History    Marital status:      Spouse name: Not on file    Number of children: Not on file    Years of education: Not on file    Highest education level: Not on file   Occupational History    Not on file   Tobacco Use    Smoking status: Former     Types: Cigarettes    Smokeless tobacco: Never   Vaping Use    Vaping status: Never Used   Substance and Sexual Activity    Alcohol use: Never    Drug use: Never    Sexual activity: Not on file   Other Topics Concern    Not on file   Social History Narrative    Not on file     Social Drivers of Health     Financial Resource Strain: Not on file   Food Insecurity: Not on file   Transportation Needs: Not on file   Physical Activity: Not on file   Stress: Not on file   Social Connections: Not on file   Intimate Partner Violence: Not on file   Housing Stability: Not on file      History reviewed. No pertinent family history.    ROS:  "Negative except as detailed in HPI.     Objective:   /60 (BP Location: Left arm, Patient Position: Sitting, BP Cuff Size: Adult)   Pulse 90   Temp 37.2 °C (98.9 °F) (Temporal)   Ht 1.626 m (5' 4\")   Wt 104 kg (228 lb 2.8 oz)   SpO2 98%   BMI 39.17 kg/m²     Physical Exam:  Constitutional: Well-developed and well-nourished. Not diaphoretic. No distress.   Skin: Skin is warm and dry. No rash noted.  Head: Atraumatic without lesions.  Eyes: Conjunctivae and extraocular motions are normal. No scleral icterus.   Nose: Nares patent. Septum midline. No discharge.   Neck: Supple, trachea midline. Normal range of motion. No thyromegaly present. No lymphadenopathy--cervical or supraclavicular.  Cardiovascular: Regular rate and rhythm, 2+ pulses   Lungs: Effort normal. No chest wall masses.   Abdomen: Soft, non tender, and without distention. No rebound, guarding, masses.    Reducible ventral/umbilical hernia present with 2-3cm fascial defect.  Upper abdominal rectus diastasis also present  Extremities: Warm and well perfused, no pitting edema  Musculoskeletal: All major joints AROM full in all directions without pain.  Neurological: Alert and oriented x 3.   Psychiatric:  Behavior, mood, and affect are appropriate.        Diagnosis:     Assessment & Plan  Ventral hernia without obstruction or gangrene         Rectus diastasis               Medical Decision Making:  Today's Assessment / Status / Plan:     44 y.o. year old male with ventral hernia.  We discussed repair of the hernia(s) with a minimally invasive approach.  We also discussed possible need for open surgery and risks factors for recurrence including heavy lifting or straining, obesity, and/or smoking.  Risks/benefits/alternatives of planned surgery were discussed with the patient.  They understand issues involved and agree with the surgical treatment plan.  Will arrange for surgery at the next available time.      Robotic VHR with Mesh, upper midline " diastasis also present

## 2025-06-11 ENCOUNTER — TELEPHONE (OUTPATIENT)
Dept: SURGERY | Facility: MEDICAL CENTER | Age: 45
End: 2025-06-11
Payer: COMMERCIAL

## 2025-06-11 NOTE — TELEPHONE ENCOUNTER
Spoke to pt and confirmed check In time and date location and diet restrictions for a procedure with Dr. Fowler on 8/12

## 2025-08-08 ENCOUNTER — PRE-ADMISSION TESTING (OUTPATIENT)
Dept: ADMISSIONS | Facility: MEDICAL CENTER | Age: 45
End: 2025-08-08
Attending: SURGERY
Payer: COMMERCIAL

## 2025-08-11 ENCOUNTER — ANESTHESIA EVENT (OUTPATIENT)
Dept: SURGERY | Facility: MEDICAL CENTER | Age: 45
End: 2025-08-11
Payer: COMMERCIAL

## 2025-08-12 ENCOUNTER — ANESTHESIA (OUTPATIENT)
Dept: SURGERY | Facility: MEDICAL CENTER | Age: 45
End: 2025-08-12
Payer: COMMERCIAL

## 2025-08-12 ENCOUNTER — HOSPITAL ENCOUNTER (OUTPATIENT)
Facility: MEDICAL CENTER | Age: 45
End: 2025-08-12
Attending: SURGERY | Admitting: SURGERY
Payer: COMMERCIAL

## 2025-08-12 ENCOUNTER — PHARMACY VISIT (OUTPATIENT)
Dept: PHARMACY | Facility: MEDICAL CENTER | Age: 45
End: 2025-08-12
Payer: COMMERCIAL

## 2025-08-12 VITALS
OXYGEN SATURATION: 91 % | HEIGHT: 64 IN | HEART RATE: 78 BPM | RESPIRATION RATE: 17 BRPM | DIASTOLIC BLOOD PRESSURE: 77 MMHG | WEIGHT: 227.29 LBS | BODY MASS INDEX: 38.8 KG/M2 | TEMPERATURE: 96.9 F | SYSTOLIC BLOOD PRESSURE: 134 MMHG

## 2025-08-12 DIAGNOSIS — G89.18 POST-OP PAIN: Primary | ICD-10-CM

## 2025-08-12 LAB
ANION GAP SERPL CALC-SCNC: 13 MMOL/L (ref 7–16)
BUN SERPL-MCNC: 12 MG/DL (ref 8–22)
CALCIUM SERPL-MCNC: 9.2 MG/DL (ref 8.5–10.5)
CHLORIDE SERPL-SCNC: 106 MMOL/L (ref 96–112)
CO2 SERPL-SCNC: 21 MMOL/L (ref 20–33)
CREAT SERPL-MCNC: 0.72 MG/DL (ref 0.5–1.4)
ERYTHROCYTE [DISTWIDTH] IN BLOOD BY AUTOMATED COUNT: 43 FL (ref 35.9–50)
GFR SERPLBLD CREATININE-BSD FMLA CKD-EPI: 116 ML/MIN/1.73 M 2
GLUCOSE SERPL-MCNC: 114 MG/DL (ref 65–99)
HCT VFR BLD AUTO: 50.5 % (ref 42–52)
HGB BLD-MCNC: 16.6 G/DL (ref 14–18)
MCH RBC QN AUTO: 27.6 PG (ref 27–33)
MCHC RBC AUTO-ENTMCNC: 32.9 G/DL (ref 32.3–36.5)
MCV RBC AUTO: 84 FL (ref 81.4–97.8)
PLATELET # BLD AUTO: 233 K/UL (ref 164–446)
PMV BLD AUTO: 9.7 FL (ref 9–12.9)
POTASSIUM SERPL-SCNC: 4 MMOL/L (ref 3.6–5.5)
RBC # BLD AUTO: 6.01 M/UL (ref 4.7–6.1)
SODIUM SERPL-SCNC: 140 MMOL/L (ref 135–145)
WBC # BLD AUTO: 6.1 K/UL (ref 4.8–10.8)

## 2025-08-12 PROCEDURE — 700101 HCHG RX REV CODE 250: Performed by: SURGERY

## 2025-08-12 PROCEDURE — 160031 HCHG SURGERY MINUTES - 1ST 30 MINS LEVEL 5: Performed by: SURGERY

## 2025-08-12 PROCEDURE — 700111 HCHG RX REV CODE 636 W/ 250 OVERRIDE (IP): Mod: JZ | Performed by: STUDENT IN AN ORGANIZED HEALTH CARE EDUCATION/TRAINING PROGRAM

## 2025-08-12 PROCEDURE — 160048 HCHG OR STATISTICAL LEVEL 1-5: Performed by: SURGERY

## 2025-08-12 PROCEDURE — C1781 MESH (IMPLANTABLE): HCPCS | Performed by: SURGERY

## 2025-08-12 PROCEDURE — RXMED WILLOW AMBULATORY MEDICATION CHARGE: Performed by: SURGERY

## 2025-08-12 PROCEDURE — A9270 NON-COVERED ITEM OR SERVICE: HCPCS | Performed by: STUDENT IN AN ORGANIZED HEALTH CARE EDUCATION/TRAINING PROGRAM

## 2025-08-12 PROCEDURE — 160042 HCHG SURGERY MINUTES - EA ADDL 1 MIN LEVEL 5: Performed by: SURGERY

## 2025-08-12 PROCEDURE — 160002 HCHG RECOVERY MINUTES (STAT): Performed by: SURGERY

## 2025-08-12 PROCEDURE — 160193 HCHG PACU STANDARD - 1ST 60 MINS: Performed by: SURGERY

## 2025-08-12 PROCEDURE — 160015 HCHG STAT PREOP MINUTES: Performed by: SURGERY

## 2025-08-12 PROCEDURE — 85027 COMPLETE CBC AUTOMATED: CPT

## 2025-08-12 PROCEDURE — 160025 RECOVERY II MINUTES (STATS): Performed by: SURGERY

## 2025-08-12 PROCEDURE — 700101 HCHG RX REV CODE 250: Performed by: STUDENT IN AN ORGANIZED HEALTH CARE EDUCATION/TRAINING PROGRAM

## 2025-08-12 PROCEDURE — 502714 HCHG ROBOTIC SURGERY SERVICES: Performed by: SURGERY

## 2025-08-12 PROCEDURE — 160191 HCHG ANESTHESIA STANDARD: Performed by: SURGERY

## 2025-08-12 PROCEDURE — 80048 BASIC METABOLIC PNL TOTAL CA: CPT

## 2025-08-12 PROCEDURE — 700105 HCHG RX REV CODE 258: Performed by: SURGERY

## 2025-08-12 PROCEDURE — 160046 HCHG PACU - 1ST 60 MINS PHASE II: Performed by: SURGERY

## 2025-08-12 PROCEDURE — 700102 HCHG RX REV CODE 250 W/ 637 OVERRIDE(OP): Performed by: STUDENT IN AN ORGANIZED HEALTH CARE EDUCATION/TRAINING PROGRAM

## 2025-08-12 DEVICE — MESH PROGRIP LAPROSCOPIC SELF FIXATING (1/CA): Type: IMPLANTABLE DEVICE | Status: FUNCTIONAL

## 2025-08-12 RX ORDER — HYDROMORPHONE HYDROCHLORIDE 1 MG/ML
0.2 INJECTION, SOLUTION INTRAMUSCULAR; INTRAVENOUS; SUBCUTANEOUS
Status: DISCONTINUED | OUTPATIENT
Start: 2025-08-12 | End: 2025-08-12 | Stop reason: HOSPADM

## 2025-08-12 RX ORDER — LIDOCAINE HYDROCHLORIDE 20 MG/ML
INJECTION, SOLUTION EPIDURAL; INFILTRATION; INTRACAUDAL; PERINEURAL PRN
Status: DISCONTINUED | OUTPATIENT
Start: 2025-08-12 | End: 2025-08-12 | Stop reason: SURG

## 2025-08-12 RX ORDER — BUPIVACAINE HYDROCHLORIDE AND EPINEPHRINE 5; 5 MG/ML; UG/ML
INJECTION, SOLUTION EPIDURAL; INTRACAUDAL; PERINEURAL
Status: DISCONTINUED | OUTPATIENT
Start: 2025-08-12 | End: 2025-08-12 | Stop reason: HOSPADM

## 2025-08-12 RX ORDER — HYDROMORPHONE HYDROCHLORIDE 1 MG/ML
0.4 INJECTION, SOLUTION INTRAMUSCULAR; INTRAVENOUS; SUBCUTANEOUS
Status: DISCONTINUED | OUTPATIENT
Start: 2025-08-12 | End: 2025-08-12 | Stop reason: HOSPADM

## 2025-08-12 RX ORDER — CEFAZOLIN SODIUM 1 G/3ML
INJECTION, POWDER, FOR SOLUTION INTRAMUSCULAR; INTRAVENOUS PRN
Status: DISCONTINUED | OUTPATIENT
Start: 2025-08-12 | End: 2025-08-12 | Stop reason: HOSPADM

## 2025-08-12 RX ORDER — MEPERIDINE HYDROCHLORIDE 50 MG/ML
6.25 INJECTION INTRAMUSCULAR; INTRAVENOUS; SUBCUTANEOUS
Status: DISCONTINUED | OUTPATIENT
Start: 2025-08-12 | End: 2025-08-12 | Stop reason: HOSPADM

## 2025-08-12 RX ORDER — OXYCODONE HCL 5 MG/5 ML
5 SOLUTION, ORAL ORAL
Status: COMPLETED | OUTPATIENT
Start: 2025-08-12 | End: 2025-08-12

## 2025-08-12 RX ORDER — HALOPERIDOL 5 MG/ML
1 INJECTION INTRAMUSCULAR
Status: DISCONTINUED | OUTPATIENT
Start: 2025-08-12 | End: 2025-08-12 | Stop reason: HOSPADM

## 2025-08-12 RX ORDER — EPHEDRINE SULFATE 50 MG/ML
5 INJECTION, SOLUTION INTRAVENOUS
Status: DISCONTINUED | OUTPATIENT
Start: 2025-08-12 | End: 2025-08-12 | Stop reason: HOSPADM

## 2025-08-12 RX ORDER — SODIUM CHLORIDE, SODIUM LACTATE, POTASSIUM CHLORIDE, CALCIUM CHLORIDE 600; 310; 30; 20 MG/100ML; MG/100ML; MG/100ML; MG/100ML
INJECTION, SOLUTION INTRAVENOUS CONTINUOUS
Status: ACTIVE | OUTPATIENT
Start: 2025-08-12 | End: 2025-08-12

## 2025-08-12 RX ORDER — HYDROMORPHONE HYDROCHLORIDE 2 MG/ML
INJECTION, SOLUTION INTRAMUSCULAR; INTRAVENOUS; SUBCUTANEOUS PRN
Status: DISCONTINUED | OUTPATIENT
Start: 2025-08-12 | End: 2025-08-12 | Stop reason: SURG

## 2025-08-12 RX ORDER — LABETALOL HYDROCHLORIDE 5 MG/ML
5 INJECTION, SOLUTION INTRAVENOUS
Status: DISCONTINUED | OUTPATIENT
Start: 2025-08-12 | End: 2025-08-12 | Stop reason: HOSPADM

## 2025-08-12 RX ORDER — ALBUTEROL SULFATE 5 MG/ML
2.5 SOLUTION RESPIRATORY (INHALATION)
Status: DISCONTINUED | OUTPATIENT
Start: 2025-08-12 | End: 2025-08-12 | Stop reason: HOSPADM

## 2025-08-12 RX ORDER — DIPHENHYDRAMINE HYDROCHLORIDE 50 MG/ML
12.5 INJECTION, SOLUTION INTRAMUSCULAR; INTRAVENOUS
Status: DISCONTINUED | OUTPATIENT
Start: 2025-08-12 | End: 2025-08-12 | Stop reason: HOSPADM

## 2025-08-12 RX ORDER — OXYCODONE AND ACETAMINOPHEN 5; 325 MG/1; MG/1
1-2 TABLET ORAL EVERY 4 HOURS PRN
Qty: 20 TABLET | Refills: 0 | Status: SHIPPED | OUTPATIENT
Start: 2025-08-12 | End: 2025-08-19

## 2025-08-12 RX ORDER — ONDANSETRON 2 MG/ML
INJECTION INTRAMUSCULAR; INTRAVENOUS PRN
Status: DISCONTINUED | OUTPATIENT
Start: 2025-08-12 | End: 2025-08-12 | Stop reason: SURG

## 2025-08-12 RX ORDER — HYDRALAZINE HYDROCHLORIDE 20 MG/ML
5 INJECTION INTRAMUSCULAR; INTRAVENOUS
Status: DISCONTINUED | OUTPATIENT
Start: 2025-08-12 | End: 2025-08-12 | Stop reason: HOSPADM

## 2025-08-12 RX ORDER — ONDANSETRON 2 MG/ML
4 INJECTION INTRAMUSCULAR; INTRAVENOUS
Status: DISCONTINUED | OUTPATIENT
Start: 2025-08-12 | End: 2025-08-12 | Stop reason: HOSPADM

## 2025-08-12 RX ORDER — MIDAZOLAM HYDROCHLORIDE 1 MG/ML
INJECTION INTRAMUSCULAR; INTRAVENOUS PRN
Status: DISCONTINUED | OUTPATIENT
Start: 2025-08-12 | End: 2025-08-12 | Stop reason: SURG

## 2025-08-12 RX ORDER — ROCURONIUM BROMIDE 10 MG/ML
INJECTION, SOLUTION INTRAVENOUS PRN
Status: DISCONTINUED | OUTPATIENT
Start: 2025-08-12 | End: 2025-08-12 | Stop reason: SURG

## 2025-08-12 RX ORDER — POLYETHYLENE GLYCOL 3350 17 G/17G
17 POWDER, FOR SOLUTION ORAL PRN
Qty: 510 G | Status: SHIPPED | OUTPATIENT
Start: 2025-08-12

## 2025-08-12 RX ORDER — DEXAMETHASONE SODIUM PHOSPHATE 4 MG/ML
INJECTION, SOLUTION INTRA-ARTICULAR; INTRALESIONAL; INTRAMUSCULAR; INTRAVENOUS; SOFT TISSUE PRN
Status: DISCONTINUED | OUTPATIENT
Start: 2025-08-12 | End: 2025-08-12 | Stop reason: SURG

## 2025-08-12 RX ORDER — HYDROMORPHONE HYDROCHLORIDE 1 MG/ML
0.1 INJECTION, SOLUTION INTRAMUSCULAR; INTRAVENOUS; SUBCUTANEOUS
Status: DISCONTINUED | OUTPATIENT
Start: 2025-08-12 | End: 2025-08-12 | Stop reason: HOSPADM

## 2025-08-12 RX ORDER — LIDOCAINE HYDROCHLORIDE 40 MG/ML
SOLUTION TOPICAL PRN
Status: DISCONTINUED | OUTPATIENT
Start: 2025-08-12 | End: 2025-08-12 | Stop reason: SURG

## 2025-08-12 RX ORDER — OXYCODONE HCL 5 MG/5 ML
10 SOLUTION, ORAL ORAL
Status: COMPLETED | OUTPATIENT
Start: 2025-08-12 | End: 2025-08-12

## 2025-08-12 RX ADMIN — MIDAZOLAM HYDROCHLORIDE 2 MG: 1 INJECTION, SOLUTION INTRAMUSCULAR; INTRAVENOUS at 12:08

## 2025-08-12 RX ADMIN — HYDROMORPHONE HYDROCHLORIDE 1 MG: 2 INJECTION INTRAMUSCULAR; INTRAVENOUS; SUBCUTANEOUS at 12:24

## 2025-08-12 RX ADMIN — SUGAMMADEX 200 MG: 100 INJECTION, SOLUTION INTRAVENOUS at 13:00

## 2025-08-12 RX ADMIN — DEXAMETHASONE SODIUM PHOSPHATE 4 MG: 4 INJECTION INTRA-ARTICULAR; INTRALESIONAL; INTRAMUSCULAR; INTRAVENOUS; SOFT TISSUE at 12:42

## 2025-08-12 RX ADMIN — ONDANSETRON 4 MG: 2 INJECTION INTRAMUSCULAR; INTRAVENOUS at 12:57

## 2025-08-12 RX ADMIN — SODIUM CHLORIDE, POTASSIUM CHLORIDE, SODIUM LACTATE AND CALCIUM CHLORIDE: 600; 310; 30; 20 INJECTION, SOLUTION INTRAVENOUS at 12:05

## 2025-08-12 RX ADMIN — FENTANYL CITRATE 50 MCG: 50 INJECTION INTRAMUSCULAR; INTRAVENOUS at 13:49

## 2025-08-12 RX ADMIN — FENTANYL CITRATE 50 MCG: 50 INJECTION, SOLUTION INTRAMUSCULAR; INTRAVENOUS at 12:14

## 2025-08-12 RX ADMIN — CEFAZOLIN 2 G: 1 INJECTION, POWDER, FOR SOLUTION INTRAMUSCULAR; INTRAVENOUS at 12:17

## 2025-08-12 RX ADMIN — FENTANYL CITRATE 50 MCG: 50 INJECTION, SOLUTION INTRAMUSCULAR; INTRAVENOUS at 12:08

## 2025-08-12 RX ADMIN — OXYCODONE HYDROCHLORIDE 10 MG: 5 SOLUTION ORAL at 13:49

## 2025-08-12 RX ADMIN — ROCURONIUM BROMIDE 50 MG: 10 INJECTION INTRAVENOUS at 12:14

## 2025-08-12 RX ADMIN — LIDOCAINE HYDROCHLORIDE 100 MG: 20 INJECTION, SOLUTION EPIDURAL; INFILTRATION; INTRACAUDAL; PERINEURAL at 12:14

## 2025-08-12 RX ADMIN — LIDOCAINE HYDROCHLORIDE 4 ML: 40 SOLUTION TOPICAL at 12:14

## 2025-08-12 RX ADMIN — SODIUM CHLORIDE, POTASSIUM CHLORIDE, SODIUM LACTATE AND CALCIUM CHLORIDE: 600; 310; 30; 20 INJECTION, SOLUTION INTRAVENOUS at 10:07

## 2025-08-12 RX ADMIN — PROPOFOL 200 MG: 10 INJECTION, EMULSION INTRAVENOUS at 12:14

## 2025-08-12 ASSESSMENT — PAIN DESCRIPTION - PAIN TYPE
TYPE: SURGICAL PAIN
TYPE: SURGICAL PAIN

## 2025-08-12 ASSESSMENT — FIBROSIS 4 INDEX: FIB4 SCORE: 0.74

## (undated) DEVICE — CHLORAPREP 26 ML APPLICATOR - ORANGE TINT(25/CA)

## (undated) DEVICE — PACK DAVINCI GENERAL (3EA/CA)

## (undated) DEVICE — SHEARS MONOPOLAR CURVED DA VINCI 10X'S REUSABLE

## (undated) DEVICE — SYSTEM CLEARIFY VISUALIZATION (10EA/PK)

## (undated) DEVICE — TUBING CLEARLINK DUO-VENT - C-FLO (48EA/CA)

## (undated) DEVICE — GLOVE SZ 6.5 BIOGEL PI MICRO - PF LF (50PR/BX)

## (undated) DEVICE — GLOVE SZ 7.5 BIOGEL PI MICRO - PF LF (50PR/BX)

## (undated) DEVICE — LACTATED RINGERS INJ 1000 ML - (14EA/CA 60CA/PF)

## (undated) DEVICE — BIPOLAR FORCE DA VINCI 12X'S REISABLE

## (undated) DEVICE — COVER TIP ENDOWRIST HOT SHEAR - (10EA/BX) DA VINCI

## (undated) DEVICE — ELECTRODE DUAL RETURN W/ CORD - (50/PK)

## (undated) DEVICE — COVER LIGHT HANDLE ALC PLUS DISP (18EA/BX)

## (undated) DEVICE — DERMABOND ADVANCED - (12EA/BX)

## (undated) DEVICE — SENSOR OXIMETER ADULT SPO2 RD SET (20EA/BX)

## (undated) DEVICE — DEVICE CLOSURE ABSORBABLE BLUE SYNETURE V-LOC 1 GS-22 L12 IN (12EA/CT)

## (undated) DEVICE — SODIUM CHL IRRIGATION 0.9% 1000ML (12EA/CA)

## (undated) DEVICE — GLOVE BIOGEL PI INDICATOR SZ 6.5 SURGICAL PF LF - (50/BX 4BX/CA)

## (undated) DEVICE — DRAPE STRLE REG TOWEL 18X24 - (10/BX 4BX/CA)"

## (undated) DEVICE — GLOVE BIOGEL INDICATOR SZ 7SURGICAL PF LTX - (50/BX 4BX/CA)

## (undated) DEVICE — GOWN WARMING STANDARD FLEX - (30/CA)

## (undated) DEVICE — CANISTER SUCTION 3000ML MECHANICAL FILTER AUTO SHUTOFF MEDI-VAC NONSTERILE LF DISP (40EA/CA)

## (undated) DEVICE — SUTURE 2-0 20CM STRATAFIX SPIRAL SH NEEDLE (12/BX)

## (undated) DEVICE — SUTURE GENERAL

## (undated) DEVICE — SEAL UNIVERSAL 5MM-12MM (10EA/BX)

## (undated) DEVICE — SET LEADWIRE 5 LEAD BEDSIDE DISPOSABLE ECG (1SET OF 5/EA)

## (undated) DEVICE — NEEDLE DRIVER MEGA SUTURECUT DA VINCI 15X'S REUSABLE

## (undated) DEVICE — SET EXTENSION WITH 2 PORTS (48EA/CA) ***PART #2C8610 IS A SUBSTITUTE*****

## (undated) DEVICE — OBTURATOR BLADELESS STANDARD 8MM (6EA/BX)

## (undated) DEVICE — SLEEVE VASO DVT COMPRESSION CALF MED - (10PR/CA)

## (undated) DEVICE — GLOVE BIOGEL SZ 7 SURGICAL PF LTX - (50PR/BX 4BX/CA)

## (undated) DEVICE — DRAPE COLUMN BOX OF 20

## (undated) DEVICE — SUTURE 4-0 MONOCRYL PLUS PS-2 - 27 INCH (36/BX)

## (undated) DEVICE — GLOVE BIOGEL PI INDICATOR SZ 7.5 SURGICAL PF LF -(50/BX 4BX/CA)

## (undated) DEVICE — SUCTION INSTRUMENT YANKAUER BULBOUS TIP W/O VENT (50EA/CA)

## (undated) DEVICE — DRAPE ARM BOX OF 20